# Patient Record
(demographics unavailable — no encounter records)

---

## 2024-10-08 NOTE — HISTORY OF PRESENT ILLNESS
[FreeTextEntry1] : Kristin underwent a robotic SOFI BS SCP and mini sling in October 2023.  She presents for follow-up today. No leaking of urine. Emptying her bladder fully. No insensible loss of urine. Did bladder training on her own and that helped. Moving bowels, taking magnesium.

## 2024-10-08 NOTE — PHYSICAL EXAM
[Chaperone Present] : A chaperone was present in the examining room during all aspects of the physical examination [91103] : A chaperone was present during the pelvic exam. [Scar] : a scar was noted [Normal Appearance] : general appearance was normal [Aa ____] : Aa [unfilled] [Ba ____] : Ba [unfilled] [C ____] : C [unfilled] [GH ____] : GH [unfilled] [PB ____] : PB [unfilled] [TVL ____] : TVL  [unfilled] [Ap ____] : Ap [unfilled] [Bp ____] : Bp [unfilled] [D ____] : D [unfilled] [Absent] : absent [Normal] : no abnormalities [FreeTextEntry2] : [place] [Hernia] : no hernia observed [FreeTextEntry4] : no exposure, graft nontender